# Patient Record
Sex: MALE | URBAN - METROPOLITAN AREA
[De-identification: names, ages, dates, MRNs, and addresses within clinical notes are randomized per-mention and may not be internally consistent; named-entity substitution may affect disease eponyms.]

---

## 2022-01-05 ENCOUNTER — OFFICE VISIT (OUTPATIENT)
Dept: SURGERY | Facility: CLINIC | Age: 42
End: 2022-01-05

## 2022-01-05 VITALS — SYSTOLIC BLOOD PRESSURE: 141 MMHG | DIASTOLIC BLOOD PRESSURE: 90 MMHG | HEART RATE: 74 BPM

## 2022-01-05 DIAGNOSIS — R39.14 BENIGN PROSTATIC HYPERPLASIA WITH INCOMPLETE BLADDER EMPTYING: ICD-10-CM

## 2022-01-05 DIAGNOSIS — R79.89 LOW SERUM PROLACTIN: ICD-10-CM

## 2022-01-05 DIAGNOSIS — N40.1 BENIGN PROSTATIC HYPERPLASIA WITH INCOMPLETE BLADDER EMPTYING: ICD-10-CM

## 2022-01-05 DIAGNOSIS — R35.1 NOCTURIA: ICD-10-CM

## 2022-01-05 DIAGNOSIS — Z87.891 FORMER SMOKER: ICD-10-CM

## 2022-01-05 DIAGNOSIS — R35.0 URINARY FREQUENCY: ICD-10-CM

## 2022-01-05 DIAGNOSIS — R79.89 LOW TESTOSTERONE: Primary | ICD-10-CM

## 2022-01-05 LAB
BILIRUB UR QL: NEGATIVE
CLARITY UR: CLEAR
COLOR UR: YELLOW
GLUCOSE UR-MCNC: NEGATIVE MG/DL
HGB UR QL STRIP.AUTO: NEGATIVE
KETONES UR-MCNC: NEGATIVE MG/DL
LEUKOCYTE ESTERASE UR QL STRIP.AUTO: NEGATIVE
NITRITE UR QL STRIP.AUTO: NEGATIVE
PH UR: 6 [PH] (ref 5–8)
PROT UR-MCNC: NEGATIVE MG/DL
SP GR UR STRIP: 1.02 (ref 1–1.03)
UROBILINOGEN UR STRIP-ACNC: <2

## 2022-01-05 PROCEDURE — 3077F SYST BP >= 140 MM HG: CPT | Performed by: UROLOGY

## 2022-01-05 PROCEDURE — 3080F DIAST BP >= 90 MM HG: CPT | Performed by: UROLOGY

## 2022-01-05 PROCEDURE — 51798 US URINE CAPACITY MEASURE: CPT | Performed by: UROLOGY

## 2022-01-05 PROCEDURE — 99205 OFFICE O/P NEW HI 60 MIN: CPT | Performed by: UROLOGY

## 2022-01-05 NOTE — PROGRESS NOTES
Amanda Xie is a 39year old male. HPI:   Patient presents with:  Consult: PT presents for consult. 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 History provided by patient. Self-Referred.  Translated from [place] by Dr. Vinicio Cabello smoked for 25 years, 1 pack a day; 25 pack year history; now smokes 2 cigars a day    Review of previous records:   02/2021 Patient went to [facility] who gave him herbal medication -- unaware of what he was given; told him to drink less coffee    Urologica dyspnea and wheezing      PHYSICAL EXAM:   Constitutional: appears well hydrated alert and responsive no acute distress noted  Neurological: Oriented to time, place, person with normal affect  Exam appropriate for age; patellar reflexes unable to elect a r Office bladder scan today  01/05/2022 Office Bladder Scan = PVR 2 mL    Excluding bladder scan performed today,   I spent 62  minutes with patient and on this case today, in reviewing chart and labs before entering the room, taking patient's  history, exam alternatives to this treatment option and I answered patient's questions; patient decides to get blood draw for prolactin, LH, and testosterone total and free. Further treatment will depend on lab results.  I explain to patient that if his testosterone leve ever seen in the urine pt should receive urological work up to exclude possibility of bladder cancer. RECOMMENDATIONS AND TREATMENT PLAN      1.   Because your spouse has noticed that you briefly stop breathing when you snore heavily, there is a lino Routine      Meds This Visit:  Requested Prescriptions      No prescriptions requested or ordered in this encounter       Imaging & Referrals:  None     By signing my name below, I, Kristin Dubin,  attest that this documentation has been prepared under the

## 2022-01-05 NOTE — PATIENT INSTRUCTIONS
[facility], M.D.    1.  Because your spouse has noticed that you briefly stop breathing when you snore heavily, there is a suspicion that you have obstructive sleep apnea.   Please check with Dr. Javi rowell

## 2022-01-26 ENCOUNTER — TELEPHONE (OUTPATIENT)
Dept: SURGERY | Facility: CLINIC | Age: 42
End: 2022-01-26

## 2022-01-26 NOTE — TELEPHONE ENCOUNTER
Called pt back, verifed name and , relayed to pt msg sent via [employer] reagrding his urine results however pt has not reviewed yet. See msg below. Pt verbalized understanding and appreciative of the f/up.      Yvan Ortiz, APRN   2022 11:42